# Patient Record
Sex: FEMALE | Race: OTHER | Employment: FULL TIME | URBAN - METROPOLITAN AREA
[De-identification: names, ages, dates, MRNs, and addresses within clinical notes are randomized per-mention and may not be internally consistent; named-entity substitution may affect disease eponyms.]

---

## 2020-10-02 ENCOUNTER — HOSPITAL ENCOUNTER (EMERGENCY)
Age: 30
Discharge: HOME | End: 2020-10-02
Attending: EMERGENCY MEDICINE
Payer: MEDICAID

## 2020-10-02 VITALS
HEART RATE: 84 BPM | SYSTOLIC BLOOD PRESSURE: 130 MMHG | HEIGHT: 62 IN | WEIGHT: 180 LBS | RESPIRATION RATE: 14 BRPM | BODY MASS INDEX: 33.13 KG/M2 | TEMPERATURE: 97.8 F | OXYGEN SATURATION: 99 % | DIASTOLIC BLOOD PRESSURE: 82 MMHG

## 2020-10-02 DIAGNOSIS — K02.9 DENTAL CARIES: Primary | ICD-10-CM

## 2020-10-02 PROCEDURE — 74011250637 HC RX REV CODE- 250/637: Performed by: EMERGENCY MEDICINE

## 2020-10-02 PROCEDURE — 99283 EMERGENCY DEPT VISIT LOW MDM: CPT

## 2020-10-02 RX ORDER — CLINDAMYCIN HYDROCHLORIDE 300 MG/1
300 CAPSULE ORAL 3 TIMES DAILY
Qty: 21 CAP | Refills: 0 | Status: SHIPPED | OUTPATIENT
Start: 2020-10-02 | End: 2020-10-09

## 2020-10-02 RX ORDER — TRAMADOL HYDROCHLORIDE 50 MG/1
50 TABLET ORAL
Qty: 15 TAB | Refills: 0 | Status: SHIPPED | OUTPATIENT
Start: 2020-10-02 | End: 2020-10-07

## 2020-10-02 RX ORDER — CLINDAMYCIN HYDROCHLORIDE 150 MG/1
300 CAPSULE ORAL
Status: COMPLETED | OUTPATIENT
Start: 2020-10-02 | End: 2020-10-02

## 2020-10-02 RX ADMIN — CLINDAMYCIN HYDROCHLORIDE 300 MG: 150 CAPSULE ORAL at 23:04

## 2020-10-03 NOTE — ED NOTES
I have reviewed discharge instructions with the patient. The patient verbalized understanding. NAD. VSS. Ambulatory with steady gait. AOx4.

## 2020-10-05 NOTE — ED PROVIDER NOTES
EMERGENCY DEPARTMENT HISTORY AND PHYSICAL EXAM    Date: 10/2/2020  Patient Name: Tank Grimaldo    History of Presenting Illness     Chief Complaint   Patient presents with    Dental Pain         History Provided By: Patient    Additional History (Context):   10:40 PM  Tank Grimaldo is a 27 y.o. female with PMHX of asthma, gastric bypass who presents to the emergency department C/O dental pain. Patient says she is been having discomfort to both upper and lower jaw for the last several days. She gets an occasional ache over the last month or so but over the last 2 days it is increased markedly. She is tried using anti-inflammatories but instructed not to do so given her previous gastric bypass. She denies any fevers or chills or trouble swallowing or voice changes. She notices a significant worsening of pain with temperature changes especially when breathing in cold air drinking cold objects or occlusion. Social History  She practically never drinks alcohol, denies the use of marijuana or tobacco products    Family History  Family history is not remarkable for immunocompromise. PCP: Maeve Keys MD    Current Outpatient Medications   Medication Sig Dispense Refill    clindamycin (CLEOCIN) 300 mg capsule Take 1 Cap by mouth three (3) times daily for 7 days. 21 Cap 0    traMADoL (Ultram) 50 mg tablet Take 1 Tab by mouth every four (4) hours as needed for Pain for up to 5 days. Max Daily Amount: 300 mg. 15 Tab 0       Past History     Past Medical History:  Past Medical History:   Diagnosis Date    Asthma        Past Surgical History:  Past Surgical History:   Procedure Laterality Date    HX DILATION AND CURETTAGE  2015    HX GASTRIC BYPASS  2013       Family History:  History reviewed. No pertinent family history. Social History:  Social History     Tobacco Use    Smoking status: Never Smoker    Smokeless tobacco: Never Used   Substance Use Topics    Alcohol use:  Yes Comment: occasionally    Drug use: Yes     Types: Marijuana       Allergies: Allergies   Allergen Reactions    Shellfish Derived Itching         Review of Systems   Review of Systems   Constitutional: Negative for chills and fever. HENT: Positive for dental problem. Negative for facial swelling, mouth sores and rhinorrhea. Allergic/Immunologic: Negative for immunocompromised state. Neurological: Negative for weakness. Hematological: Negative for adenopathy. Does not bruise/bleed easily. All other systems reviewed and are negative. Physical Exam     Vitals:    10/02/20 2216 10/02/20 2245   BP: 130/82    Pulse: 84    Resp: 14    Temp: 97.8 °F (36.6 °C)    SpO2: 99% 99%   Weight: 81.6 kg (180 lb)    Height: 5' 2\" (1.575 m)      Physical Exam  Vitals signs and nursing note reviewed. Constitutional:       General: She is not in acute distress. Appearance: She is well-developed. She is not diaphoretic. HENT:      Head: Normocephalic and atraumatic. Right Ear: External ear normal.      Left Ear: External ear normal.      Mouth/Throat:      Dentition: Abnormal dentition. Dental tenderness and dental caries present. No dental abscesses. Tongue: No lesions. Palate: No mass. Pharynx: No oropharyngeal exudate. Comments: Normal voice without stridor  Eyes:      General: No scleral icterus. Conjunctiva/sclera: Conjunctivae normal.      Pupils: Pupils are equal, round, and reactive to light. Comments: No pallor   Neck:      Musculoskeletal: Normal range of motion and neck supple. Thyroid: No thyromegaly. Vascular: No JVD. Trachea: No tracheal deviation. Cardiovascular:      Rate and Rhythm: Normal rate and regular rhythm. Heart sounds: Normal heart sounds. Pulmonary:      Effort: Pulmonary effort is normal. No respiratory distress. Breath sounds: Normal breath sounds. No stridor.    Abdominal:      General: Bowel sounds are normal. There is no distension. Palpations: Abdomen is soft. Tenderness: There is no abdominal tenderness. There is no guarding or rebound. Musculoskeletal: Normal range of motion. General: No tenderness. Comments: No soft tissue injuries   Lymphadenopathy:      Cervical: No cervical adenopathy. Skin:     General: Skin is warm and dry. Findings: No erythema or rash. Neurological:      Mental Status: She is alert and oriented to person, place, and time. GCS: GCS eye subscore is 4. GCS verbal subscore is 5. GCS motor subscore is 6. Cranial Nerves: No cranial nerve deficit. Coordination: Coordination normal.   Psychiatric:         Behavior: Behavior normal.         Thought Content: Thought content normal.         Judgment: Judgment normal.             Diagnostic Study Results     Labs -   No results found for this or any previous visit (from the past 12 hour(s)). Radiologic Studies -   No orders to display     CT Results  (Last 48 hours)    None        CXR Results  (Last 48 hours)    None          Medications given in the ED-  Medications   clindamycin (CLEOCIN) capsule 300 mg (300 mg Oral Given 10/2/20 2304)         Medical Decision Making   I am the first provider for this patient. I reviewed the vital signs, available nursing notes, past medical history, past surgical history, family history and social history. Vital Signs-Reviewed the patient's vital signs. Pulse Oximetry Analysis -99% on room air      Records Reviewed: NURSING NOTES AND PREVIOUS MEDICAL RECORDS    Provider Notes (Medical Decision Making):   Patient is a stable dental complaint without obvious findings of airway edema or Pallavi's angina. She does have multiple visible caries which look to be small however there is also some dental erosions approaching the gingival line. There are no findings to suggest peritonsillar or periapical abscess on examination. We will treat her symptomatically encourage. See a dentist which he is already tried in fact. Give her handout for referrals for dental services. Procedures:  Procedures    ED Course:   10:40 AM: Initial assessment performed. The patients presenting problems have been discussed, and they are in agreement with the care plan formulated and outlined with them. I have encouraged them to ask questions as they arise throughout their visit. Diagnosis and Disposition       DISCHARGE NOTE:  11 PM  Jeni Hart's  results have been reviewed with her. She has been counseled regarding her diagnosis, treatment, and plan. She verbally conveys understanding and agreement of the signs, symptoms, diagnosis, treatment and prognosis and additionally agrees to follow up as discussed. She also agrees with the care-plan and conveys that all of her questions have been answered. I have also provided discharge instructions for her that include: educational information regarding their diagnosis and treatment, and list of reasons why they would want to return to the ED prior to their follow-up appointment, should her condition change. She has been provided with education for proper emergency department utilization. CLINICAL IMPRESSION:    1. Dental caries        PLAN:  1. D/C Home  2. Discharge Medication List as of 10/2/2020 10:59 PM      START taking these medications    Details   clindamycin (CLEOCIN) 300 mg capsule Take 1 Cap by mouth three (3) times daily for 7 days. , Print, Disp-21 Cap,R-0      traMADoL (Ultram) 50 mg tablet Take 1 Tab by mouth every four (4) hours as needed for Pain for up to 5 days. Max Daily Amount: 300 mg., Print, Disp-15 Tab,R-0           3.    Follow-up Information     Follow up With Specialties Details Why Contact Info    [address] [facility] [address]  In 5 days  [place] [address] [id number] [phone]    [facility]  In 5 days  [address]  [phone] _______________________________    This note was partially transcribed via voice recognition software. Although efforts have been made to catch any discrepancies, it may contain sound alike words, grammatical errors, or nonsensical words.
